# Patient Record
(demographics unavailable — no encounter records)

---

## 2024-10-07 NOTE — PHYSICAL EXAM
[General Appearance - Alert] : alert [General Appearance - In No Acute Distress] : in no acute distress [Ankle Swelling (On Exam)] : not present [Varicose Veins Of Lower Extremities] : not present [] : not present [1+] : left foot dorsalis pedis 1+ [FreeTextEntry3] :  Patient has weak 1/4 dorsalis pedis and 1/4 posterior tibial artery pulses.  Increased temperature gradient and decreased capillary refill time bilaterally.  No acute ischemic changes noted. [No Joint Swelling] : no joint swelling [de-identified] :  The ankle dorsiflexion is limited bilaterally due to a tight gastrocnemius with leg extended. [Skin Color & Pigmentation] : normal skin color and pigmentation [Skin Turgor] : normal skin turgor [Skin Lesions] : no skin lesions [Foot Ulcer] : no foot ulcer [FreeTextEntry1] :  Toenails are thickened, dystrophic, discolored yellow, painful, elongated and with subungual debris 1,2,3,4,5 bilaterally. [Oriented To Time, Place, And Person] : oriented to person, place, and time

## 2024-10-07 NOTE — ASSESSMENT
[FreeTextEntry1] : Discussed with patient at length the importance of glucose control to limit complications. The patient is advised to follow-up with their primary care physician / endocrinologist for labs and the importance of the HbA1c. Discussed proper shoe gear and foot care.  Monitor for any skin color or temperature changes as directed. They are advised to stretch the calf as directed. Discussed etiology and treatment options for the painful fungal toenails including proper hygiene/footcare, topical medications and oral antifungals. All fungal toenails debrided 1,2,3,4,5 right and 1,2,3,4,5 left. Continue the antifungal as prescribed.

## 2024-10-07 NOTE — REASON FOR VISIT
[Follow-Up Visit] : a follow-up visit for [Family Member] : family member [FreeTextEntry2] : foot exam

## 2024-10-07 NOTE — HISTORY OF PRESENT ILLNESS
[FreeTextEntry1] : ASIYA  is a 78 year old male diagnosed with diabetes in 1999. Patient presents for diabetic foot exam and c/o elongated toenails difficult to cut on his own. The swelling and pain of his feet has resolved.

## 2024-10-25 NOTE — ASSESSMENT
[FreeTextEntry1] : T2DM in patient overweight with HTN. hypoT and dementia with memory impairment. Pt is not a candidate for tight control due to his dementia. Now new renal lesion.   DM2 : A1c7.7 .. we cannot do better than this since he lives w wife and not sure compliance. continue Lantus 20 u BID . Unclear what actually dose he takes.   he is not compliant with both doses due to memory problems. cont Jardiance 25 mg qd bgs 2x day eye exam utd eats a lot of cookies and cakes and icecream   Hypothyroid: TSh above target , probably due to noncompliance.  .Cont Lt4 50 mcg qd  HTN: bp at target on meds. Continue current management.  HLD: lipids very good. 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 cont Prolia with rheumatology cont calcium and vitamin d  All lab results reviewed independently and discussed with patient with extensive discussion.  All questions answered Laboratory tests ordered today Continue other current medications

## 2025-01-10 NOTE — END OF VISIT
[Mother] : mother [Time Spent: ___ minutes] : I have spent [unfilled] minutes of time on the encounter which excludes teaching and separately reported services.

## 2025-01-12 NOTE — CONSULT LETTER
[Dear  ___] : Dear  [unfilled], [Consult Letter:] : I had the pleasure of evaluating your patient, [unfilled]. [Please see my note below.] : Please see my note below. [Consult Closing:] : Thank you very much for allowing me to participate in the care of this patient.  If you have any questions, please do not hesitate to contact me. [Sincerely,] : Sincerely, [DrVictorina  ___] : Dr. AGUIRRE [DrVictorina ___] : Dr. AGUIRRE [FreeTextEntry3] : Jose\par  Myron I. Kleiner, M.D., FACR \par  Chief, Division of Rheumatology\par  Department of Medicine \par  [facility]

## 2025-01-12 NOTE — CONSULT LETTER
[Dear  ___] : Dear  [unfilled], [Consult Letter:] : I had the pleasure of evaluating your patient, [unfilled]. [Please see my note below.] : Please see my note below. [Consult Closing:] : Thank you very much for allowing me to participate in the care of this patient.  If you have any questions, please do not hesitate to contact me. [Sincerely,] : Sincerely, [DrVictorina  ___] : Dr. AGUIRRE [DrVictorina ___] : Dr. AGUIRRE [FreeTextEntry3] : Jose\par  Myron I. Kleiner, M.D., FACR \par  Chief, Division of Rheumatology\par  Department of Medicine \par  [facility]

## 2025-01-12 NOTE — HISTORY OF PRESENT ILLNESS
[FreeTextEntry1] : ASIYA PAINTER is a 78 year old man who presents for follow up office visit for further evaluation of joint symptoms and rheumatic diseases, including osteoarthritis, Sjogren's syndrome, osteoporosis with wedge compression fractures, fibromyalgia, chronic low back pain/foraminal stenosis/lumbar spinal stenosis, accompanied by daughter Miriam.  Note: Patient was last seen August 2024  Patient feels fairly well. Pt notes some low back pain with occasional radiation down right lower extremity to the toes. Recent dry eyes and dry mouth using artificial tears, biotene mouthwash, and oral hydration with adequate relief. Recent sleep disturbance and fatigue. Denies recent cardio exercise. The patient continues calcium, vitamin D supplements, and Prolia injections q.6 months (Last injection: December 30, 2024) without side effect or complication.  Patient denies rash or side effects with current medications. Patient is content with current medication regimen.

## 2025-01-12 NOTE — ADDENDUM
[FreeTextEntry1] :  I, Kevyn Ignacio, acted solely as a scribe for Dr. Myron I. Kleiner, MD. on 01/10/2025. I personally performed the services described in the documentation, reviewed the documentation recorded by the scribe in my presence, and it accurately and completely records my words and actions.

## 2025-01-12 NOTE — ASSESSMENT
[FreeTextEntry1] : Impression: ASIYA PAINTER is a 78 year old man who presents for follow up office visit for further evaluation of joint symptoms and rheumatic diseases including osteoarthritis, osteoporosis with wedge compression fractures, Sjogren's Syndrome, fibromyalgia, and chronic low back pain/foraminal stenosis/lumbar spinal stenosis, accompanied by daughter Miriam  Note: Patient was last seen August 2024  Patient feels fairly well. Pt notes some recent low back pain with occasional radiation down right lower extremity to the toes secondary to combination of osteoarthritis and chronic low back pain/foraminal stenosis/lumbar spinal stenosis. Recent dry eyes and dry mouth, on PE pt had dry eyes and dry tongue secondary to Sjogren's Syndrome --using artificial tears, biotene mouthwash, and oral hydration with adequate relief. Recent sleep disturbance and fatigue secondary to fibromyalgia. Denies recent cardio exercise.  Recent laboratory tests results reveal no significant changes or results, with extensive discussion. The patient continues calcium, vitamin D supplements, and Prolia injections q.6 months (Last injection: December 30, 2024) without side effect or complication for his osteoporosis with wedge compression fractures.  Patient denies rash or side effects with current medications. Patient is content with current medication regimen.   Plan: I reviewed chart and previous records I reviewed recent lab results with patient and his daughter with extensive discussion Laboratory tests ordered  see list below  with coordination of care -- to be performed May 1st 2025  X-rays ordered  see list below  with coordination of care -- to be performed after April 1st 2025 Diagnosis and prognosis discussed Continue current medications (other than those changed below)  Prolia 60 mg SQ q.6 months (Possible side effects explained including AVN of jaw) - To be authorized and performed after July 2nd, 2025  Chlorzoxazone 500 mg q.d. h.s. (possible side effects explained)  Patient declines any changes or additions to medication regimen.  Artificial tears one drop each eye q.i.d. and p.r.n.(Possible side effects explained) Biotene mouthwash/spray q.i.d. and p.r.n.(Possible side effects explained) Oral Hydration Patient declines oral medication for dryness Daily exercise starting at 10 minutes per day, gradually increasing to at least 30 minutes per day--emphasized--extensive discussion--- patient's daughter states that he refuses to exercise even go for a walk with his wife who does so regularly--- I urged the patient to exercise PCP f/u regarding positive depression screening performed during office visit today -- with coordination of care--of note, the "snapshot" is not giving me access Return visit 4 months All questions and concerns were addressed Total time for this office visit, including face-to-face time and non-face-to-face time, 71 minutes--- including review of the chart and previous records, detailed review of his medical history, review of previous lab results with extensive discussion with the patient and his daughter, ordering lab tests with coordination of care, review of  previous imaging reports/x-ray results, ordering of new x-rays with coordination of care, detailed medication history, review of medications going forward with their possible side effects, extensive discussion urging him to exercise daily such as going for a walk with his wife who does so regularly in which patient's daughter states he refuses to do despite her urging him as well, PCP follow-up regarding positive depression screening emphasized to the patient and his daughter, reviewed modalities to treat his dryness with extensive discussion, ordered his next Prolia injection with coordination of care with the authorization team and nursing staff

## 2025-02-03 NOTE — PHYSICAL EXAM
[General Appearance - Alert] : alert [General Appearance - In No Acute Distress] : in no acute distress [1+] : left foot dorsalis pedis 1+ [No Joint Swelling] : no joint swelling [Skin Color & Pigmentation] : normal skin color and pigmentation [Skin Turgor] : normal skin turgor [Skin Lesions] : no skin lesions [Oriented To Time, Place, And Person] : oriented to person, place, and time [Ankle Swelling (On Exam)] : not present [Varicose Veins Of Lower Extremities] : not present [] : not present [FreeTextEntry3] :  Patient has weak 1/4 dorsalis pedis and 1/4 posterior tibial artery pulses.  Increased temperature gradient and decreased capillary refill time bilaterally.  No acute ischemic changes noted. [de-identified] :  The ankle dorsiflexion is limited bilaterally due to a tight gastrocnemius with leg extended. [Foot Ulcer] : no foot ulcer [FreeTextEntry1] :  Toenails are thickened, dystrophic, discolored yellow, painful, elongated and with subungual debris 1,2,3,4,5 bilaterally.

## 2025-02-03 NOTE — HISTORY OF PRESENT ILLNESS
[FreeTextEntry1] : ASIYA  is a 78 year old male diagnosed with diabetes in 1999. Patient presents for diabetic foot exam and c/o elongated toenails difficult to cut on his own. Denies further concerns   a1c 7.7 Oct 2024

## 2025-02-14 NOTE — ASSESSMENT
[Retinopathy Screening] : Patient was referred to ophthalmology for retinopathy screening [FreeTextEntry1] : T2DM in patient overweight with HTN. hypoT and dementia with memory impairment. Pt is not a candidate for tight control due to his dementia. Now new renal lesion.  Soon to have cardiac cath (probably CAD )   DM2 : A1c7.5.  he lives w wife and not sure compliance. continue Lantus 20 u BID . Unclear what actually dose he takes.   he is not compliant with both doses due to memory problems. cont Jardiance 25 mg qd bgs 2x day diabetic retinopathy: folowup with ophth  eats a lot of  sweets   Hypothyroid: TSh above target , probably due to noncompliance.  Cont Lt4 50 mcg qd  HTN: bp at target on meds. Continue current management.  HLD: lipids very good. 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 cont Prolia with rheumatology cont calcium and vitamin d  All lab results reviewed independently and discussed with patient with extensive discussion.  All questions answered Laboratory tests ordered today Continue other current medications

## 2025-02-26 NOTE — DISCUSSION/SUMMARY
[FreeTextEntry1] : Mr. ASIYA PAINTER is a 78 year male with known HTN, HLD, DM2. HTN and DM not well controlled.  At the present time He is C/O chest pain, atypical. Last stress test was done 2/2023 that WNL. Had recent cath and PCI to the LCx with KATHERINE x 1. Now on DAPT. There is a 50% stenosis in the RCA. Will treat medically for now with GDMT.  We reviewed with him and daughter routine laboratory tests Routine follow up in 6 months. [EKG obtained to assist in diagnosis and management of assessed problem(s)] : EKG obtained to assist in diagnosis and management of assessed problem(s)

## 2025-02-26 NOTE — REASON FOR VISIT
Attending Attestation (For Attendings USE Only)... [Symptom and Test Evaluation] : symptom and test evaluation [FreeTextEntry1] : Follow for HTN and A. Fib

## 2025-02-26 NOTE — REVIEW OF SYSTEMS
[Headache] : headache [SOB] : no shortness of breath [Chest Discomfort] : chest discomfort [Leg Claudication] : no intermittent leg claudication [Palpitations] : palpitations [Syncope] : no syncope [Joint Pain] : joint pain [Negative] : Heme/Lymph

## 2025-02-26 NOTE — HISTORY OF PRESENT ILLNESS
[FreeTextEntry1] : 79 yo man, , father of 5. [place] speaking from [facility]. Worked as a . No prior heart disease. Has HTN, HLD, DM2 and one episode of A. Fib following back surgery (laminectomy L1-2) in Feb 27, 2023. During the procedure he had intracranial bleeding and atrial fibrillation. Not related to OAC. He was discharged on Eliquis that was stopped by Dr. Juarez 10/18/2023. Echo done in 3/2023 revealed normal LV function with mild LVH. Holter in 10/2023 revealed NSR with SVT.  Patient C/O chest pain, not effort related, last seconds not associated with SOB, nausea or diaphoresis, last seconds and resolves with some chest massage. Occasional palpitations, also lasts seconds. Denies dizziness or ankle swelling. Underwent CTCA that revealed CAD. On 2/20/2025 he underwent cardia cath that revealed two vessel disease/ RCA=50% and LCx=80% stenosis that was treated with KATHERINE x 1. No complications. However patient states that occasionally still has chest pain that lasts seconds and palpitations. No SOB, orthopnea or PND.  Hyperlipidemia with a TC=160 mg/dL; HDL= 37 mg/dL; MZ=523 mg/dL; LDL=93mg/dL (6/2024) Diabetes Type 2 with an UcF7j=3.9% (6/2024) HTN treated with meds. Nephrolithiasis Gout treated with meds Depressive disorder Dementia, mild treated S/P Laminectomy L1-2 in 2/2023 S/P Colectomy for diverticulitis. Quit smoking at age 30 Doesnt drink alcohol Denies the use of illicit drugs.

## 2025-02-26 NOTE — ASSESSMENT
[FreeTextEntry1] : EKG performed today at the office revealed a NSR 56 bpm, with normal AQRS, WA, QRS and QTc.

## 2025-05-19 NOTE — CONSULT LETTER
[Dear  ___] : Dear  [unfilled], [Consult Letter:] : I had the pleasure of evaluating your patient, [unfilled]. [Please see my note below.] : Please see my note below. [Consult Closing:] : Thank you very much for allowing me to participate in the care of this patient.  If you have any questions, please do not hesitate to contact me. [Sincerely,] : Sincerely, [FreeTextEntry3] : Myron Myron I. Kleiner, M.D., FACR Chief, Division of Rheumatology Department of Medicine [facility]

## 2025-05-19 NOTE — HISTORY OF PRESENT ILLNESS
[FreeTextEntry1] : 78-year-old man for followup office visit regarding his joint symptoms and rheumatic diseases, including osteoarthritis, Sjogren syndrome, fibromyalgia, osteoporosis with wedge compression fractures, chronic low back pain/foraminal stenosis/lumbar spinal stenosis, accompanied by his daughter Miriam.  Patient has persistent low back pain without radiation.  He did not receive the chlorzoxazone prescription secondary to lack of insurance coverage for it.  He has dry eyes and dry mouth, using artificial tears, oral hydration with adequate relief, but not using Biotene mouthwash secondary to the need.  He has some sleep disturbance and fatigue.  Patient continues calcium and vitamin D supplements and Prolia injection every 6 months (last injection was December 30, 2024) without side effect or complication.  Patient denies rash or side effects with their medications.  Patient did not perform the x-rays which I ordered last visit.  PMH: February--coronary stent placed

## 2025-05-19 NOTE — REVIEW OF SYSTEMS
[Dry Eyes] : dryness of the eyes [As noted in HPI] : as noted in HPI [As Noted in HPI] : as noted in HPI [Negative] : Heme/Lymph

## 2025-05-19 NOTE — ASSESSMENT
[FreeTextEntry1] : Impression: 78-year-old man for followup office visit regarding his joint symptoms and rheumatic diseases, including osteoarthritis, Sjogren's syndrome, fibromyalgia, osteoporosis with wedge compression fractures, chronic low back pain/foraminal stenosis/lumbar spinal stenosis, accompanied by his daughter Miriam  Patient has persistent low back pain secondary to his osteoarthritis and osteoporosis with wedge compression fractures.  He did not receive the chlorzoxazone which I prescribed last visit secondary to lack of insurance coverage for it.  Patient continues calcium and vitamin D supplements and Prolia injections every 6 months (last injection December 30, 2024), without side effect or complication for his osteoporosis with wedge compression fractures.  Recent lab results were reviewed and were unrevealing, with extensive discussion.  Patient denies rash or side effects with their medications.  Patient is content with their current treatment regimen.  He did not perform the x-rays as ordered last visit.  Plan: I reviewed  chart and previous records  I reviewed previous lab results with patient and his daughter--with extensive discussion Laboratory tests ordered today-see list below--with coordination of care X-rays ordered--patient has been ordered declined-there is within the room willing to perform x-rays LS spine--with coordination of care Obtain recent chest x-ray report Diagnosis and prognosis discussed Continue other current medications (other than those changed below) Metaxalone 400 mg at bedtime with food (Possible side effects explained) Artificial tears one drop each eye q.i.d. and p.r.n.(Possible side effects explained) Biotin mouthwash/spray q.i.d. and p.r.n.(Possible side effects explained) Oral hydration Patient declined oral medication for their dryness Patient continues to decline exercise Prolia 60 mg subcutaneous every 6 months--next injection after July 2, 2025--with coordination of care with the authorization team and nursing staff Return visit 4 months All questions and concerns were addressed.

## 2025-05-20 NOTE — PHYSICAL EXAM
[General Appearance - Alert] : alert [General Appearance - In No Acute Distress] : in no acute distress [1+] : left foot dorsalis pedis 1+ [No Joint Swelling] : no joint swelling [Skin Color & Pigmentation] : normal skin color and pigmentation [Skin Turgor] : normal skin turgor [Skin Lesions] : no skin lesions [Oriented To Time, Place, And Person] : oriented to person, place, and time [Ankle Swelling (On Exam)] : not present [Varicose Veins Of Lower Extremities] : not present [] : not present [FreeTextEntry3] :  Patient has weak 1/4 dorsalis pedis and 1/4 posterior tibial artery pulses.  Increased temperature gradient and decreased capillary refill time bilaterally.  No acute ischemic changes noted. [de-identified] :  The ankle dorsiflexion is limited bilaterally due to a tight gastrocnemius with leg extended. [Foot Ulcer] : no foot ulcer [FreeTextEntry1] :  Toenails are thickened, dystrophic, discolored yellow, painful, elongated and with subungual debris 1,2,3,4,5 bilaterally.

## 2025-05-20 NOTE — REASON FOR VISIT
Patient with abdominal pain, diffuse and cramping and bloating worsening after colonoscopy this morning around 9 or 9:30 AM.  Patient was sent here by his GI physician, Dr. Jerome Sims for evaluation of perforation. This was a routine colonoscopy due to age of 46 and 1 snare loop of one polyp was performed. Apparently this was in a difficult location and somewhat tricky according to the patient's wife. Pain worse with movement. No relieving factors. Patient has not passed gas. The history is provided by the patient. Abdominal Pain    This is a new problem. The current episode started 1 to 2 hours ago. The problem occurs constantly. The problem has not changed since onset. The pain is located in the generalized abdominal region. The quality of the pain is sharp and cramping. The pain is severe. Pertinent negatives include no fever, no diarrhea, no melena, no nausea, no vomiting, no constipation, no dysuria, no frequency, no chest pain and no back pain. Nothing worsens the pain. The pain is relieved by nothing. Past workup includes colonoscopy.         Past Medical History:   Diagnosis Date    Asthma     Hypercholesterolemia        Past Surgical History:   Procedure Laterality Date    HX CYST REMOVAL           Family History:   Problem Relation Age of Onset    Cancer Mother         Lung Cancer    High Cholesterol Father     Gall Bladder Disease Father        Social History     Socioeconomic History    Marital status:      Spouse name: Not on file    Number of children: Not on file    Years of education: Not on file    Highest education level: Not on file   Occupational History    Not on file   Social Needs    Financial resource strain: Not on file    Food insecurity:     Worry: Not on file     Inability: Not on file    Transportation needs:     Medical: Not on file     Non-medical: Not on file   Tobacco Use    Smoking status: Never Smoker    Smokeless tobacco: Current User    Tobacco comment: Dip   Substance and Sexual Activity    Alcohol use: Yes     Alcohol/week: 6.0 standard drinks     Types: 6 Cans of beer per week    Drug use: Not on file    Sexual activity: Not on file   Lifestyle    Physical activity:     Days per week: Not on file     Minutes per session: Not on file    Stress: Not on file   Relationships    Social connections:     Talks on phone: Not on file     Gets together: Not on file     Attends Bahai service: Not on file     Active member of club or organization: Not on file     Attends meetings of clubs or organizations: Not on file     Relationship status: Not on file    Intimate partner violence:     Fear of current or ex partner: Not on file     Emotionally abused: Not on file     Physically abused: Not on file     Forced sexual activity: Not on file   Other Topics Concern    Not on file   Social History Narrative    Not on file         ALLERGIES: Patient has no known allergies. Review of Systems   Constitutional: Negative for fever. Cardiovascular: Negative for chest pain. Gastrointestinal: Positive for abdominal pain. Negative for constipation, diarrhea, melena, nausea and vomiting. Genitourinary: Negative for dysuria and frequency. Musculoskeletal: Negative for back pain. Vitals:    01/24/20 1150   BP: 135/74   Pulse: 68   Resp: 17   Temp: 98 °F (36.7 °C)   SpO2: 97%   Weight: 102.1 kg (225 lb)   Height: 6' 1\" (1.854 m)            Physical Exam  Vitals signs and nursing note reviewed. Constitutional:       Appearance: He is well-developed. Comments: Appears uncomfortable. HENT:      Mouth/Throat:      Pharynx: No oropharyngeal exudate. Eyes:      Conjunctiva/sclera: Conjunctivae normal.      Pupils: Pupils are equal, round, and reactive to light. Neck:      Musculoskeletal: Neck supple. Cardiovascular:      Rate and Rhythm: Normal rate and regular rhythm. Heart sounds: Normal heart sounds.    Pulmonary:      Effort: Pulmonary effort is normal.      Breath sounds: Normal breath sounds. Abdominal:      General: There is distension. Palpations: Abdomen is soft. Tenderness: There is generalized tenderness. There is no guarding or rebound. Hernia: No hernia is present. Comments: Tympanitic bowel sounds present   Musculoskeletal: Normal range of motion. General: No tenderness. Lymphadenopathy:      Cervical: No cervical adenopathy. Skin:     General: Skin is warm and dry. Neurological:      Mental Status: He is alert and oriented to person, place, and time. MDM  Number of Diagnoses or Management Options  Diagnosis management comments: I will medicate the patient's pain. Acute abdominal series to evaluate for obvious perforation. CT scan if abdominal series negative or if felt indicated by the on-call surgeon if there is perforation. Ileus possible as well.    4:46 PM  Patient improved and has passed gas. He requests Tylenol for mild headache. We will discharge him home with Levsin for abdominal cramping and continued use of over-the-counter simethicone.        Amount and/or Complexity of Data Reviewed  Clinical lab tests: ordered and reviewed (Results for orders placed or performed during the hospital encounter of 01/24/20  -CBC WITH AUTOMATED DIFF       Result                      Value             Ref Range           WBC                         6.6               4.3 - 11.1 K*       RBC                         4.88              4.23 - 5.6 M*       HGB                         15.0              13.6 - 17.2 *       HCT                         44.9              41.1 - 50.3 %       MCV                         92.0              79.6 - 97.8 *       MCH                         30.7              26.1 - 32.9 *       MCHC                        33.4              31.4 - 35.0 *       RDW                         12.6              11.9 - 14.6 %       PLATELET                    177               150 - 450 K/*       MPV                         10.7              9.4 - 12.3 FL       ABSOLUTE NRBC               0.00              0.0 - 0.2 K/*       DF                          AUTOMATED                             NEUTROPHILS                 67                43 - 78 %           LYMPHOCYTES                 21                13 - 44 %           MONOCYTES                   9                 4.0 - 12.0 %        EOSINOPHILS                 2                 0.5 - 7.8 %         BASOPHILS                   1                 0.0 - 2.0 %         IMMATURE GRANULOCYTES       0                 0.0 - 5.0 %         ABS. NEUTROPHILS            4.4               1.7 - 8.2 K/*       ABS. LYMPHOCYTES            1.4               0.5 - 4.6 K/*       ABS. MONOCYTES              0.6               0.1 - 1.3 K/*       ABS. EOSINOPHILS            0.1               0.0 - 0.8 K/*       ABS. BASOPHILS              0.0               0.0 - 0.2 K/*       ABS. IMM.  GRANS.            0.0               0.0 - 0.5 K/*  -METABOLIC PANEL, COMPREHENSIVE       Result                      Value             Ref Range           Sodium                      139               136 - 145 mm*       Potassium                   3.8               3.5 - 5.1 mm*       Chloride                    106               98 - 107 mmo*       CO2                         27                21 - 32 mmol*       Anion gap                   6 (L)             7 - 16 mmol/L       Glucose                     104 (H)           65 - 100 mg/*       BUN                         9                 6 - 23 MG/DL        Creatinine                  0.99              0.8 - 1.5 MG*       GFR est AA                  >60               >60 ml/min/1*       GFR est non-AA              >60               >60 ml/min/1*       Calcium                     9.2               8.3 - 10.4 M*       Bilirubin, total            0.7               0.2 - 1.1 MG*       ALT (SGPT)                  84 (H)            12 - 65 U/L AST (SGOT)                  34                15 - 37 U/L         Alk. phosphatase            57                50 - 136 U/L        Protein, total              8.2               6.3 - 8.2 g/*       Albumin                     4.3               3.5 - 5.0 g/*       Globulin                    3.9 (H)           2.3 - 3.5 g/*       A-G Ratio                   1.1 (L)           1.2 - 3.5      )  Tests in the radiology section of [facility]: ordered and reviewed (Xr Abd Acute W 1 V Chest    Result Date: 1/24/2020  EXAMINATION: CHEST AND ABDOMEN RADIOGRAPH 1/24/2020 12:31 PM ACCESSION NUMBER: [id number] INDICATION: abdominal pain, acute following colonoscopy this morning-free air? Ileus? COMPARISON: Chest x-ray 11/13/2019 TECHNIQUE: A PA upright view of the chest and AP upright and supine views of the abdomen were obtained. FINDINGS: The cardiac silhouette is within normal limits. No evidence of focal airspace disease, pneumothorax, or pleural effusion. Thoracic spine spondylosis. No evidence of pneumoperitoneum on the upright view. There is gaseous distention of the entire length of the colon. There is no pathologic small bowel distention. Unremarkable included osseous structures. No radiopaque foreign body. IMPRESSION: 1. No evidence of pneumoperitoneum. 2. Diffuse gaseous distention of the colon, likely from immediate prior colonoscopy. 3. No evidence of acute cardiopulmonary disease. VOICE DICTATED BY: Dr. Comfort Crouch    Result Date: 1/24/2020  EXAMINATION: CT  ABDOMEN / PELVIS   1/24/2020 2:28 PM ACCESSION NUMBER:  [id number] INDICATION:  Severe abdominal pain status post colonoscopy this morning-perforation? Ileus? COMPARISON: Same-day acute abdominal series. TECHNIQUE: Contiguous axial computed tomographic images were obtained from the domes of the diaphragm to the symphysis pubis after the administration of intravenous contrast. Coronal reconstructions were also performed.  Oral contrast was also administered. Radiation dose reduction techniques were used for this study:  Our CT scanners use one or all of the following: Automated exposure control, adjustment of the mA and/or kVp according to patient's size, iterative reconstruction. FINDINGS: LIMITED THORAX: Atelectasis or scarring at the lung bases. Unremarkable included portions of the heart and pericardium. PERITONEUM/MESENTERY: No evidence of ascites, free gas, or lymphadenopathy. GI TRACT: Gas-distended colon. SPLEEN: Normal ADRENALS: Normal PANCREAS: Normal LIVER: Normal GALLBLADDER: Normal KIDNEYS: Normal BLADDER/: Unremarkable urinary bladder. The prostate gland and seminal vesicles are unremarkable. VESSELS: The main portal vein and major tributary branches are patent. There is atherosclerosis of the abdominal aorta and branch vessels. BONES/SOFT TISSUES: Lumbar spine spondylosis without suspicious lytic or blastic bony lesions. IMPRESSION: 1. Distended colon from recent colonoscopy. No colonic wall thickening, pneumatosis, or evidence of colonic perforation. 2. Overall no CT evidence of acute process in the abdomen or pelvis. VOICE DICTATED BY: Dr. Jordon Barahona    )  Discuss the patient with other providers: yes (Discussed with Dr. Carol Arora before and after CT results. Suggested Dulcolax suppository and Levsin to help patient pass gas. Approved simethicone use as well. Recommended observation in ED until patient passes gas.   Dr. Carol Arora is seen the patient in the emergency department as well.)           Procedures [Follow-Up Visit] : a follow-up visit for [Family Member] : family member [FreeTextEntry2] : foot exam

## 2025-05-20 NOTE — HISTORY OF PRESENT ILLNESS
[FreeTextEntry1] : ASIYA is a 78-year-old male diagnosed with diabetes in 1999. Patient presents for diabetic foot exam and c/o elongated toenails difficult to cut on his own. Denies further concerns  FBS- 76  a1c 7.5 February 10th

## 2025-06-06 NOTE — ASSESSMENT
[FreeTextEntry1] :  T2DM in patient overweight with HTN. hypoT and dementia with memory impairment. Pt is not a candidate for tight control due to his dementia. Now new renal lesion.  Had  CAD s/p PCI with stent in Feb 2025.  daughter in room at every visit. she is in charge   DM2 : A1c7.9.  he lives w wife with dementia and not sure compliance. Eats a lot of sweets.  continue glargine  20 u BID . Unclear what actually dose he takes.   he is not compliant with both doses due to memory problems. cont Jardiance 25 mg qd bgs 2x day diabetic retinopathy: folowup with ophth   Hypothyroid: TSh above target , probably due to noncompliance but will cont Lt4 50 mcg qd  HTN: bp at target on meds. Continue current management.  HLD: lipids very good. 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 cont Prolia with rheumatology cont calcium and vitamin d  All lab results reviewed independently and discussed with patient with extensive discussion.  All questions answered Laboratory tests ordered today Continue other current medications